# Patient Record
Sex: FEMALE | Race: WHITE | Employment: FULL TIME | URBAN - METROPOLITAN AREA
[De-identification: names, ages, dates, MRNs, and addresses within clinical notes are randomized per-mention and may not be internally consistent; named-entity substitution may affect disease eponyms.]

---

## 2017-02-20 PROCEDURE — 88175 CYTOPATH C/V AUTO FLUID REDO: CPT | Performed by: OBSTETRICS & GYNECOLOGY

## 2020-05-26 PROBLEM — O09.529 AMA (ADVANCED MATERNAL AGE) MULTIGRAVIDA 35+: Status: ACTIVE | Noted: 2020-05-26

## 2020-06-25 ENCOUNTER — TELEPHONE (OUTPATIENT)
Dept: PERINATAL CARE | Facility: HOSPITAL | Age: 35
End: 2020-06-25

## 2020-07-08 PROBLEM — E66.01 MORBID OBESITY WITH BODY MASS INDEX (BMI) OF 40.0 TO 44.9 IN ADULT (HCC): Status: ACTIVE | Noted: 2020-01-01

## 2020-07-08 NOTE — PROGRESS NOTES
[facility] Consultation    Dear Dr. Radha Juarez,    Thank you for requesting ultrasound evaluation and maternal fetal medicine consultation on your patient Donald Shepherd.   As you are aware she is a 38year old female with a Harvey pregna patient. Indication: morbid obesity. Maternal age (38 years). ____________________________________________________________________________  History: Age: 38 years. Maternal age at [facility]: 38 years.  : 2 Para: 1.  _________________________________ inferior vena cava, superior vena cava. Aortic arch: Normal.  Genitalia: normal.    Summary of Ultrasound Findings:  Impression: The fetal measurements are consistent with the established EDC.  No gross ultrasound evidence of structural abnormalities are ultrasound assessment for fetal growth (at 32 weeks). In addition, weekly NST's (initiating at 36 weeks gestation for women 35-39 years and at 28 weeks gestation for women 40 years and older) are also advised.  Routine obstetric care is more than adequate t having a  delivery, only 14 additional cesareans would need to be performed to avert one unexplained fetal death.   Hence, weekly NST's are advised for women of advanced maternal age; testing should be initiated at 42 weeks for women 35-39 years and the health benefits of weight loss, some research suggests that pregnancy may be safer for both the mother and fetus after weight-loss surgery.     Reductions in obesity related problems may be improved after weight-loss such as preeclampsia, gestational [facility] maternal weight and BMI are independent risk factors for preeclampsia.              Studies have found that the increased risk of  birth in obese gravidas is primarily associated with obesity-related medical and  complications, rather than a associated with higher rates of gestational diabetes, hypertensive complications, fetal macrosomia and delivery complications. Women with weight loss or insufficient weight gain have higher rates of small for gestational age infants.     A recent study fou

## 2020-07-15 ENCOUNTER — OFFICE VISIT (OUTPATIENT)
Dept: PERINATAL CARE | Facility: HOSPITAL | Age: 35
End: 2020-07-15
Attending: OBSTETRICS & GYNECOLOGY
Payer: COMMERCIAL

## 2020-07-15 VITALS
SYSTOLIC BLOOD PRESSURE: 128 MMHG | BODY MASS INDEX: 40.34 KG/M2 | WEIGHT: 251 LBS | DIASTOLIC BLOOD PRESSURE: 89 MMHG | HEART RATE: 97 BPM | HEIGHT: 66 IN

## 2020-07-15 DIAGNOSIS — IMO0002 EVALUATE ANATOMY NOT SEEN ON PRIOR SONOGRAM: ICD-10-CM

## 2020-07-15 DIAGNOSIS — O99.210 OBESITY AFFECTING PREGNANCY, ANTEPARTUM: ICD-10-CM

## 2020-07-15 DIAGNOSIS — E66.01 MORBID OBESITY WITH BODY MASS INDEX (BMI) OF 40.0 TO 44.9 IN ADULT (HCC): ICD-10-CM

## 2020-07-15 DIAGNOSIS — O09.522 MULTIGRAVIDA OF ADVANCED MATERNAL AGE IN SECOND TRIMESTER: ICD-10-CM

## 2020-07-15 PROCEDURE — 76811 OB US DETAILED SNGL FETUS: CPT | Performed by: OBSTETRICS & GYNECOLOGY

## 2020-07-15 PROCEDURE — 76817 TRANSVAGINAL US OBSTETRIC: CPT | Performed by: OBSTETRICS & GYNECOLOGY

## 2020-07-15 PROCEDURE — 99244 OFF/OP CNSLTJ NEW/EST MOD 40: CPT | Performed by: OBSTETRICS & GYNECOLOGY

## 2020-09-28 PROBLEM — Z34.90 PREGNANCY: Status: ACTIVE | Noted: 2020-09-28

## (undated) NOTE — Clinical Note
Continue care with Dr. Jose Alberto Ferrera growth ultrasound in the third trimesterShe was advised to limit gestational weight gain to 15 pounds or lessWeekly NST at 39 weeksIf she is unable to tolerate a 50 g glucose load, please have her monitor her blood sugars